# Patient Record
Sex: MALE | Race: BLACK OR AFRICAN AMERICAN | NOT HISPANIC OR LATINO | Employment: OTHER | URBAN - METROPOLITAN AREA
[De-identification: names, ages, dates, MRNs, and addresses within clinical notes are randomized per-mention and may not be internally consistent; named-entity substitution may affect disease eponyms.]

---

## 2023-02-27 PROBLEM — N40.1 BPH WITH OBSTRUCTION/LOWER URINARY TRACT SYMPTOMS: Status: ACTIVE | Noted: 2023-02-27

## 2023-02-27 PROBLEM — M10.9 GOUT: Status: ACTIVE | Noted: 2023-02-27

## 2023-02-27 PROBLEM — M72.2 PLANTAR FASCIITIS, RIGHT: Status: ACTIVE | Noted: 2023-02-27

## 2023-02-27 PROBLEM — M65.9 TENOSYNOVITIS, WRIST: Status: ACTIVE | Noted: 2023-02-27

## 2023-02-27 PROBLEM — N13.8 BPH WITH OBSTRUCTION/LOWER URINARY TRACT SYMPTOMS: Status: ACTIVE | Noted: 2023-02-27

## 2023-02-27 PROBLEM — K21.9 GERD (GASTROESOPHAGEAL REFLUX DISEASE): Status: ACTIVE | Noted: 2023-02-27

## 2023-02-27 PROBLEM — K63.5 BENIGN COLONIC POLYP: Status: ACTIVE | Noted: 2023-02-27

## 2023-02-27 PROBLEM — G47.30 SLEEP APNEA: Status: ACTIVE | Noted: 2023-02-27

## 2023-02-27 PROBLEM — M13.80 MIGRATORY POLYARTHRITIS: Status: ACTIVE | Noted: 2023-02-27

## 2023-02-27 PROBLEM — M89.8X1 SCAPULALGIA: Status: ACTIVE | Noted: 2023-02-27

## 2023-02-27 PROBLEM — M48.061 SPINAL STENOSIS OF LUMBAR REGION: Status: ACTIVE | Noted: 2023-02-27

## 2023-02-27 PROBLEM — G47.33 OSA (OBSTRUCTIVE SLEEP APNEA): Status: ACTIVE | Noted: 2023-02-27

## 2023-02-27 PROBLEM — E78.00 HYPERCHOLESTEREMIA: Status: ACTIVE | Noted: 2023-02-27

## 2023-02-27 PROBLEM — M25.532 LEFT WRIST PAIN: Status: ACTIVE | Noted: 2023-02-27

## 2023-02-27 PROBLEM — M54.50 LUMBAR BACK PAIN: Status: ACTIVE | Noted: 2023-02-27

## 2023-02-27 PROBLEM — M65.939 TENOSYNOVITIS, WRIST: Status: ACTIVE | Noted: 2023-02-27

## 2023-02-27 PROBLEM — I10 BENIGN ESSENTIAL HYPERTENSION: Status: ACTIVE | Noted: 2023-02-27

## 2023-02-27 PROBLEM — M19.90 INFLAMMATORY ARTHRITIS: Status: ACTIVE | Noted: 2023-02-27

## 2023-02-27 PROBLEM — E03.9 HYPOTHYROIDISM: Status: ACTIVE | Noted: 2023-02-27

## 2023-02-27 PROBLEM — M48.061 LUMBAR CANAL STENOSIS: Status: ACTIVE | Noted: 2023-02-27

## 2023-02-27 PROBLEM — L30.9 ECZEMA: Status: ACTIVE | Noted: 2023-02-27

## 2023-02-27 PROBLEM — M17.0 ARTHRITIS OF BOTH KNEES: Status: ACTIVE | Noted: 2023-02-27

## 2023-02-27 PROBLEM — R53.83 FATIGUE: Status: ACTIVE | Noted: 2023-02-27

## 2023-02-27 PROBLEM — R05.9 COUGH: Status: ACTIVE | Noted: 2023-02-27

## 2023-02-27 PROBLEM — R97.20 ELEVATED PSA: Status: ACTIVE | Noted: 2023-02-27

## 2023-02-27 PROBLEM — N18.31 STAGE 3A CHRONIC KIDNEY DISEASE (MULTI): Status: ACTIVE | Noted: 2023-02-27

## 2023-02-27 PROBLEM — D64.9 ANEMIA: Status: ACTIVE | Noted: 2023-02-27

## 2023-02-27 PROBLEM — M25.439 SWOLLEN WRIST: Status: ACTIVE | Noted: 2023-02-27

## 2023-02-27 RX ORDER — ATORVASTATIN CALCIUM 20 MG/1
1 TABLET, FILM COATED ORAL DAILY
COMMUNITY
Start: 2014-12-10 | End: 2023-08-28

## 2023-02-27 RX ORDER — ALLOPURINOL 300 MG/1
1 TABLET ORAL DAILY
COMMUNITY
Start: 2020-12-11 | End: 2023-04-04 | Stop reason: SDUPTHER

## 2023-02-27 RX ORDER — KETOCONAZOLE 20 MG/G
CREAM TOPICAL
COMMUNITY
Start: 2020-10-22 | End: 2024-02-08

## 2023-02-27 RX ORDER — VERAPAMIL HYDROCHLORIDE 180 MG/1
1 TABLET, FILM COATED, EXTENDED RELEASE ORAL DAILY
COMMUNITY
Start: 2022-01-31 | End: 2023-12-04 | Stop reason: ALTCHOICE

## 2023-02-27 RX ORDER — VERAPAMIL HYDROCHLORIDE 240 MG/1
1 CAPSULE, EXTENDED RELEASE ORAL DAILY
COMMUNITY
End: 2023-04-05 | Stop reason: SDUPTHER

## 2023-02-27 RX ORDER — TRIAMTERENE AND HYDROCHLOROTHIAZIDE 37.5; 25 MG/1; MG/1
1 CAPSULE ORAL DAILY
COMMUNITY
End: 2023-04-05 | Stop reason: SDUPTHER

## 2023-02-27 RX ORDER — TAMSULOSIN HYDROCHLORIDE 0.4 MG/1
1 CAPSULE ORAL DAILY
COMMUNITY
Start: 2015-10-27 | End: 2023-04-05 | Stop reason: SDUPTHER

## 2023-02-27 RX ORDER — LOSARTAN POTASSIUM 50 MG/1
1 TABLET ORAL DAILY
COMMUNITY
Start: 2019-06-25 | End: 2023-04-05 | Stop reason: SDUPTHER

## 2023-02-27 RX ORDER — LANSOPRAZOLE 30 MG/1
1 CAPSULE, DELAYED RELEASE ORAL DAILY
COMMUNITY
Start: 2015-10-27 | End: 2023-06-08

## 2023-02-27 RX ORDER — MULTIVIT-MIN/FERROUS FUMARATE 15 MG
TABLET ORAL
COMMUNITY
Start: 2020-12-11 | End: 2023-04-05 | Stop reason: SDUPTHER

## 2023-02-27 RX ORDER — TRIAMCINOLONE ACETONIDE 1 MG/G
OINTMENT TOPICAL
COMMUNITY
Start: 2019-12-12 | End: 2023-06-08

## 2023-04-04 ENCOUNTER — OFFICE VISIT (OUTPATIENT)
Dept: PRIMARY CARE | Facility: CLINIC | Age: 88
End: 2023-04-04
Payer: MEDICARE

## 2023-04-04 VITALS — WEIGHT: 247 LBS | BODY MASS INDEX: 34.45 KG/M2 | SYSTOLIC BLOOD PRESSURE: 108 MMHG | DIASTOLIC BLOOD PRESSURE: 52 MMHG

## 2023-04-04 DIAGNOSIS — R07.9 CHEST PAIN, UNSPECIFIED TYPE: ICD-10-CM

## 2023-04-04 DIAGNOSIS — E78.5 HYPERLIPIDEMIA, UNSPECIFIED HYPERLIPIDEMIA TYPE: ICD-10-CM

## 2023-04-04 DIAGNOSIS — R53.83 FATIGUE, UNSPECIFIED TYPE: Primary | ICD-10-CM

## 2023-04-04 DIAGNOSIS — I10 HYPERTENSION, UNSPECIFIED TYPE: ICD-10-CM

## 2023-04-04 DIAGNOSIS — D64.9 ANEMIA, UNSPECIFIED TYPE: ICD-10-CM

## 2023-04-04 PROCEDURE — 80053 COMPREHEN METABOLIC PANEL: CPT | Performed by: INTERNAL MEDICINE

## 2023-04-04 PROCEDURE — 80061 LIPID PANEL: CPT | Performed by: INTERNAL MEDICINE

## 2023-04-04 PROCEDURE — 3078F DIAST BP <80 MM HG: CPT | Performed by: INTERNAL MEDICINE

## 2023-04-04 PROCEDURE — 36415 COLL VENOUS BLD VENIPUNCTURE: CPT | Performed by: INTERNAL MEDICINE

## 2023-04-04 PROCEDURE — 84443 ASSAY THYROID STIM HORMONE: CPT | Performed by: INTERNAL MEDICINE

## 2023-04-04 PROCEDURE — 1170F FXNL STATUS ASSESSED: CPT | Performed by: INTERNAL MEDICINE

## 2023-04-04 PROCEDURE — 1036F TOBACCO NON-USER: CPT | Performed by: INTERNAL MEDICINE

## 2023-04-04 PROCEDURE — 99214 OFFICE O/P EST MOD 30 MIN: CPT | Performed by: INTERNAL MEDICINE

## 2023-04-04 PROCEDURE — 1159F MED LIST DOCD IN RCRD: CPT | Performed by: INTERNAL MEDICINE

## 2023-04-04 PROCEDURE — 3074F SYST BP LT 130 MM HG: CPT | Performed by: INTERNAL MEDICINE

## 2023-04-04 PROCEDURE — 1160F RVW MEDS BY RX/DR IN RCRD: CPT | Performed by: INTERNAL MEDICINE

## 2023-04-04 PROCEDURE — 85025 COMPLETE CBC W/AUTO DIFF WBC: CPT | Performed by: INTERNAL MEDICINE

## 2023-04-04 PROCEDURE — G0439 PPPS, SUBSEQ VISIT: HCPCS | Performed by: INTERNAL MEDICINE

## 2023-04-04 RX ORDER — TAMSULOSIN HYDROCHLORIDE 0.4 MG/1
0.4 CAPSULE ORAL
COMMUNITY
Start: 2022-04-05

## 2023-04-04 RX ORDER — ALLOPURINOL 300 MG/1
300 TABLET ORAL
COMMUNITY
Start: 2021-09-03

## 2023-04-04 RX ORDER — IBUPROFEN 800 MG/1
800 TABLET ORAL 3 TIMES DAILY PRN
COMMUNITY

## 2023-04-04 RX ORDER — LOSARTAN POTASSIUM 50 MG/1
50 TABLET ORAL
COMMUNITY
Start: 2021-08-05

## 2023-04-04 RX ORDER — TRIAMTERENE/HYDROCHLOROTHIAZID 37.5-25 MG
0.5 TABLET ORAL DAILY
COMMUNITY

## 2023-04-04 RX ORDER — VERAPAMIL HYDROCHLORIDE 240 MG/1
240 TABLET, FILM COATED, EXTENDED RELEASE ORAL
COMMUNITY
Start: 2021-10-16 | End: 2023-04-05 | Stop reason: SDUPTHER

## 2023-04-04 ASSESSMENT — ENCOUNTER SYMPTOMS
LOSS OF SENSATION IN FEET: 1
DEPRESSION: 0
OCCASIONAL FEELINGS OF UNSTEADINESS: 1

## 2023-04-04 ASSESSMENT — PATIENT HEALTH QUESTIONNAIRE - PHQ9
1. LITTLE INTEREST OR PLEASURE IN DOING THINGS: NOT AT ALL
SUM OF ALL RESPONSES TO PHQ9 QUESTIONS 1 AND 2: 0
2. FEELING DOWN, DEPRESSED OR HOPELESS: NOT AT ALL

## 2023-04-04 ASSESSMENT — ACTIVITIES OF DAILY LIVING (ADL)
BATHING: INDEPENDENT
DRESSING: INDEPENDENT

## 2023-04-04 NOTE — PROGRESS NOTES
Subjective   Reason for Visit: Norberto Bauman is an 87 y.o. male here for a Medicare Wellness visit.     Past Medical, Surgical, and Family History reviewed and updated in chart.    Reviewed all medications by prescribing practitioner or clinical pharmacist (such as prescriptions, OTCs, herbal therapies and supplements) and documented in the medical record.    HPI  87 years old male comes in to see me for a Medicare subsequent wellness exam and follow-up visit.  He feels well has no specific symptoms or complaints.  Feels tired upon exertion which he expected for his age and after he has reviewed it with his cardiologist.  No specific symptoms now.  Lives in [place] by himself is single.  Comes from a large family of 12 brothers and sisters.  7 still living.  He quit smoking 25 years ago after smoking 2 packs/day for 25 years.  No alcohol intake.  No surgery done on him in the past.  Retired teacher.  Mother  age 78 cancer of the colon.  Father  from heart attack and heart disease and is 84 years old,  Patient Care Team:  Bill Frye MD as PCP - General Anderson De La Torre DO as PCP - [employer] PCP     Review of Systems  12 system reviewed and negative for any new symptoms or complaint.  Ask if he likes to have his swelling in the elbows chronic tophi or chronic lipomas removed and he answered no.  Objective   Vitals:  /52 (BP Location: Left arm, Patient Position: Sitting)   Wt 112 kg (247 lb)   BMI 34.45 kg/m²       Physical Exam  Alert oriented pleasant cooperative in no distress.  Nonicteric sclera or jaundice.  Face symmetrical cranial nerves intact.  Lungs clear no rales wheezing or crackles.  Neck supple no masses lymph no thyromegaly or jugular venous distention.  Heart normal S1 and S2 regular rhythm.  Abdomen benign neurologically.  Assessment/Plan   Problem List Items Addressed This Visit          Hematologic    Anemia    Relevant Orders    CBC       Other    Fatigue -  Primary    Relevant Orders    Thyroid Stimulating Hormone     Other Visit Diagnoses       Hyperlipidemia, unspecified hyperlipidemia type        Relevant Orders    Lipid Panel    Hypertension, unspecified type        Relevant Orders    Comprehensive Metabolic Panel    Chest pain, unspecified type        Relevant Orders    ECG 12 lead (Ancillary Performed)

## 2023-04-05 ENCOUNTER — TELEPHONE (OUTPATIENT)
Dept: PRIMARY CARE | Facility: CLINIC | Age: 88
End: 2023-04-05
Payer: MEDICARE

## 2023-04-05 NOTE — TELEPHONE ENCOUNTER
I called the patient today and discussed his lab results with him.  Everything within normal limit.  He has been having a decline in his kidney function and he knows that for many years now.  He will be interested after we talk to him to start and try Farxiga to prevent any more further decline in renal function.  He will give it a shot not knowing if it is covered by his insurance but he will find out.

## 2023-06-03 DIAGNOSIS — L30.9 DERMATITIS, UNSPECIFIED: ICD-10-CM

## 2023-06-03 DIAGNOSIS — K21.9 GASTRO-ESOPHAGEAL REFLUX DISEASE WITHOUT ESOPHAGITIS: ICD-10-CM

## 2023-06-08 RX ORDER — TRIAMCINOLONE ACETONIDE 1 MG/G
OINTMENT TOPICAL
Qty: 454 G | Refills: 3 | Status: SHIPPED | OUTPATIENT
Start: 2023-06-08

## 2023-06-08 RX ORDER — LANSOPRAZOLE 30 MG/1
CAPSULE, DELAYED RELEASE ORAL
Qty: 90 CAPSULE | Refills: 1 | Status: SHIPPED | OUTPATIENT
Start: 2023-06-08 | End: 2023-12-04

## 2023-08-03 ENCOUNTER — OFFICE VISIT (OUTPATIENT)
Dept: PRIMARY CARE | Facility: CLINIC | Age: 88
End: 2023-08-03
Payer: MEDICARE

## 2023-08-03 VITALS
BODY MASS INDEX: 33.74 KG/M2 | TEMPERATURE: 96.8 F | SYSTOLIC BLOOD PRESSURE: 130 MMHG | WEIGHT: 241 LBS | HEIGHT: 71 IN | DIASTOLIC BLOOD PRESSURE: 70 MMHG

## 2023-08-03 DIAGNOSIS — R26.2 DIFFICULTY WALKING: ICD-10-CM

## 2023-08-03 DIAGNOSIS — I10 HYPERTENSION, UNSPECIFIED TYPE: Primary | ICD-10-CM

## 2023-08-03 DIAGNOSIS — M1A.9XX0 CHRONIC GOUT WITHOUT TOPHUS, UNSPECIFIED CAUSE, UNSPECIFIED SITE: ICD-10-CM

## 2023-08-03 DIAGNOSIS — E78.5 HYPERLIPIDEMIA, UNSPECIFIED HYPERLIPIDEMIA TYPE: ICD-10-CM

## 2023-08-03 DIAGNOSIS — K21.9 GASTROESOPHAGEAL REFLUX DISEASE WITHOUT ESOPHAGITIS: ICD-10-CM

## 2023-08-03 PROCEDURE — 1159F MED LIST DOCD IN RCRD: CPT | Performed by: INTERNAL MEDICINE

## 2023-08-03 PROCEDURE — 80053 COMPREHEN METABOLIC PANEL: CPT | Performed by: INTERNAL MEDICINE

## 2023-08-03 PROCEDURE — 1160F RVW MEDS BY RX/DR IN RCRD: CPT | Performed by: INTERNAL MEDICINE

## 2023-08-03 PROCEDURE — 80061 LIPID PANEL: CPT | Performed by: INTERNAL MEDICINE

## 2023-08-03 PROCEDURE — 99214 OFFICE O/P EST MOD 30 MIN: CPT | Performed by: INTERNAL MEDICINE

## 2023-08-03 PROCEDURE — 1126F AMNT PAIN NOTED NONE PRSNT: CPT | Performed by: INTERNAL MEDICINE

## 2023-08-03 PROCEDURE — 85025 COMPLETE CBC W/AUTO DIFF WBC: CPT | Performed by: INTERNAL MEDICINE

## 2023-08-03 PROCEDURE — 3078F DIAST BP <80 MM HG: CPT | Performed by: INTERNAL MEDICINE

## 2023-08-03 PROCEDURE — 3075F SYST BP GE 130 - 139MM HG: CPT | Performed by: INTERNAL MEDICINE

## 2023-08-03 PROCEDURE — 1036F TOBACCO NON-USER: CPT | Performed by: INTERNAL MEDICINE

## 2023-08-03 ASSESSMENT — PAIN SCALES - GENERAL: PAINLEVEL: 0-NO PAIN

## 2023-08-03 NOTE — PROGRESS NOTES
"Subjective   Patient ID: Norberto Bauman is a 88 y.o. male who presents for Follow-up (Follow up).    HPI   88 years old male comes in to see me today for a follow-up visit.  He feels well and has no specific complaint except he is feeling tired.  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  So he is not very active at all spend his time sitting and watching TV every day.  I encouraged him to have a plan and the program on a daily basis at least 5 days a week where he should go outside and walk or going in raining he should stay and is living room and walk around.  Dr. Martinez cardiologist stopped his verapamil because he was feeling very tired and he had swollen lower extremities.  He is feeling better now but still weak and tired.  His medications reviewed and reconciled.  Disability placard printed.  Review of Systems  12 system reviewed and 12 systems are negative.  He promised he would go on the plan and do it as we talk to him about and he felt very excited about that.  Objective   /70 (BP Location: Left arm, Patient Position: Sitting, BP Cuff Size: Adult)   Temp 36 °C (96.8 °F) (Temporal)   Ht 1.803 m (5' 11\")   Wt 109 kg (241 lb)   BMI 33.61 kg/m²     Physical Exam  Alert oriented in no acute distress.  Pleasant and cooperative.  Nonicteric sclera with no jaundice.  Face symmetrical cranial nerves intact.  Neck supple no masses no lymph node thyromegaly or jugular venous distention.  Lungs clear no rales no wheezing or crackles.  Heart normal S1 and S2 regular rhythm.  Abdomen benign neurologically intact.  He lost 6 pounds which is a good thing for him.  Lab was done and that will include CBC CMP and a lipid panel.  Assessment/Plan   Diagnoses and all orders for this visit:  Hypertension, unspecified type  -     Comprehensive Metabolic Panel  -     CBC  Hyperlipidemia, unspecified hyperlipidemia type  -     Lipid Panel  Difficulty walking  -     Disability Placard  Chronic gout without tophus, unspecified " cause, unspecified site  Gastroesophageal reflux disease without esophagitis

## 2023-08-10 ENCOUNTER — TELEPHONE (OUTPATIENT)
Dept: PRIMARY CARE | Facility: CLINIC | Age: 88
End: 2023-08-10
Payer: MEDICARE

## 2023-08-26 DIAGNOSIS — E78.00 PURE HYPERCHOLESTEROLEMIA, UNSPECIFIED: ICD-10-CM

## 2023-08-28 RX ORDER — ATORVASTATIN CALCIUM 20 MG/1
20 TABLET, FILM COATED ORAL DAILY
Qty: 90 TABLET | Refills: 3 | Status: SHIPPED | OUTPATIENT
Start: 2023-08-28

## 2023-12-02 DIAGNOSIS — K21.9 GASTRO-ESOPHAGEAL REFLUX DISEASE WITHOUT ESOPHAGITIS: ICD-10-CM

## 2023-12-04 ENCOUNTER — OFFICE VISIT (OUTPATIENT)
Dept: PRIMARY CARE | Facility: CLINIC | Age: 88
End: 2023-12-04
Payer: MEDICARE

## 2023-12-04 VITALS
BODY MASS INDEX: 34.38 KG/M2 | DIASTOLIC BLOOD PRESSURE: 70 MMHG | HEART RATE: 56 BPM | SYSTOLIC BLOOD PRESSURE: 116 MMHG | WEIGHT: 246.5 LBS | TEMPERATURE: 97.2 F | OXYGEN SATURATION: 95 %

## 2023-12-04 DIAGNOSIS — S20.411A: ICD-10-CM

## 2023-12-04 DIAGNOSIS — Z23 FLU VACCINE NEED: Primary | ICD-10-CM

## 2023-12-04 DIAGNOSIS — E78.5 HYPERLIPIDEMIA, UNSPECIFIED HYPERLIPIDEMIA TYPE: ICD-10-CM

## 2023-12-04 DIAGNOSIS — I10 HYPERTENSION, UNSPECIFIED TYPE: ICD-10-CM

## 2023-12-04 DIAGNOSIS — K21.9 GASTROESOPHAGEAL REFLUX DISEASE WITHOUT ESOPHAGITIS: ICD-10-CM

## 2023-12-04 PROCEDURE — G0008 ADMIN INFLUENZA VIRUS VAC: HCPCS | Performed by: INTERNAL MEDICINE

## 2023-12-04 PROCEDURE — 3078F DIAST BP <80 MM HG: CPT | Performed by: INTERNAL MEDICINE

## 2023-12-04 PROCEDURE — 1126F AMNT PAIN NOTED NONE PRSNT: CPT | Performed by: INTERNAL MEDICINE

## 2023-12-04 PROCEDURE — 1036F TOBACCO NON-USER: CPT | Performed by: INTERNAL MEDICINE

## 2023-12-04 PROCEDURE — 1159F MED LIST DOCD IN RCRD: CPT | Performed by: INTERNAL MEDICINE

## 2023-12-04 PROCEDURE — 90662 IIV NO PRSV INCREASED AG IM: CPT | Performed by: INTERNAL MEDICINE

## 2023-12-04 PROCEDURE — 99214 OFFICE O/P EST MOD 30 MIN: CPT | Performed by: INTERNAL MEDICINE

## 2023-12-04 PROCEDURE — 3074F SYST BP LT 130 MM HG: CPT | Performed by: INTERNAL MEDICINE

## 2023-12-04 PROCEDURE — 1160F RVW MEDS BY RX/DR IN RCRD: CPT | Performed by: INTERNAL MEDICINE

## 2023-12-04 RX ORDER — LANSOPRAZOLE 30 MG/1
CAPSULE, DELAYED RELEASE ORAL
Qty: 90 CAPSULE | Refills: 1 | Status: SHIPPED | OUTPATIENT
Start: 2023-12-04

## 2023-12-04 RX ORDER — PREDNISONE 10 MG/1
10 TABLET ORAL DAILY
Qty: 14 TABLET | Refills: 1 | Status: SHIPPED | OUTPATIENT
Start: 2023-12-04 | End: 2024-06-01

## 2023-12-04 ASSESSMENT — PATIENT HEALTH QUESTIONNAIRE - PHQ9
2. FEELING DOWN, DEPRESSED OR HOPELESS: NOT AT ALL
SUM OF ALL RESPONSES TO PHQ9 QUESTIONS 1 AND 2: 0
1. LITTLE INTEREST OR PLEASURE IN DOING THINGS: NOT AT ALL

## 2023-12-04 ASSESSMENT — ENCOUNTER SYMPTOMS
LOSS OF SENSATION IN FEET: 0
OCCASIONAL FEELINGS OF UNSTEADINESS: 1
DEPRESSION: 0

## 2023-12-04 NOTE — PROGRESS NOTES
Subjective   Patient ID: Norberto Bauman is a 88 y.o. male who presents for Follow-up (4 month fuv).    HPI   88 years old male comes in to see me today after he spent 1 day overnight at [facility] for right chest pain radiating to his right back exactly at the right scapula.  HE lift grocery stores heavy in general from the store to his apartment.  He has no fall and no injuries.  On examination I was able to make a diagnosis.  Review of Systems  12 system review 12 system negative.  Chest pain on the right side moving and radiating upward and to the back at the right scapula.  Chest x-ray done an EKG is done at the hospital or negative for  Objective   /70 (BP Location: Right arm, Patient Position: Sitting, BP Cuff Size: Large adult)   Pulse 56   Temp 36.2 °C (97.2 °F) (Temporal)   Wt 112 kg (246 lb 8 oz)   SpO2 95%   BMI 34.38 kg/m²     Physical Exam  .  He is alert oriented pleasant cooperative in no distress.  Nonicteric sclera no jaundice.  Face symmetrical cranial nerves intact.  Lungs clear no rales wheezing or crackles mostly on the right examined carefully and no abnormal wheezing or rales.  Heart normal S1 and S2 regular rhythm systolic ejection murmur present.  Abdomen benign neurologically intact.  Diagnosis of right scapulitis on the right side involving the right chest similar to pleurisy but very short-lived pain only lasting few seconds.  Not taking any medication for it.  Advised the patient to use a heating pad on his scapula half hour on every 2 hours while awake.  We gave him prednisone for 7 days 10 mg a day for the inflammation take care of that scapula.  Let me know course.  Assessment/Plan   Diagnoses and all orders for this visit:  Flu vaccine need  -     Flu vaccine, quadrivalent, high-dose, preservative free, age 65y+ (FLUZONE)  Abrasion of upper back excluding scapular region, right, initial encounter  -     predniSONE (Deltasone) 10 mg tablet; Take 1 tablet (10 mg)  by mouth once daily.  Hypertension, unspecified type  Hyperlipidemia, unspecified hyperlipidemia type  Gastroesophageal reflux disease without esophagitis

## 2023-12-04 NOTE — PROGRESS NOTES
Subjective   Patient ID: Norberto Bauman is a 88 y.o. male who presents for Follow-up (4 month fuv).    HPI     Review of Systems    Objective   /70 (BP Location: Right arm, Patient Position: Sitting, BP Cuff Size: Large adult)   Pulse 56   Temp 36.2 °C (97.2 °F) (Temporal)   Wt 112 kg (246 lb 8 oz)   SpO2 95%   BMI 34.38 kg/m²     Physical Exam    Assessment/Plan

## 2024-02-08 DIAGNOSIS — L30.9 DERMATITIS, UNSPECIFIED: ICD-10-CM

## 2024-02-08 RX ORDER — KETOCONAZOLE 20 MG/G
CREAM TOPICAL
Qty: 60 G | Refills: 3 | Status: SHIPPED | OUTPATIENT
Start: 2024-02-08 | End: 2024-02-09 | Stop reason: SDUPTHER

## 2024-02-09 ENCOUNTER — TELEPHONE (OUTPATIENT)
Dept: PRIMARY CARE | Facility: CLINIC | Age: 89
End: 2024-02-09
Payer: MEDICARE

## 2024-02-09 DIAGNOSIS — L30.9 DERMATITIS, UNSPECIFIED: ICD-10-CM

## 2024-02-09 RX ORDER — KETOCONAZOLE 20 MG/G
CREAM TOPICAL
Qty: 60 G | Refills: 3 | Status: SHIPPED | OUTPATIENT
Start: 2024-02-09 | End: 2024-02-13 | Stop reason: SDUPTHER

## 2024-02-13 ENCOUNTER — TELEPHONE (OUTPATIENT)
Dept: PRIMARY CARE | Facility: CLINIC | Age: 89
End: 2024-02-13
Payer: MEDICARE

## 2024-02-13 DIAGNOSIS — L30.9 DERMATITIS, UNSPECIFIED: ICD-10-CM

## 2024-02-13 RX ORDER — KETOCONAZOLE 20 MG/G
CREAM TOPICAL
Qty: 60 G | Refills: 3 | Status: SHIPPED | OUTPATIENT
Start: 2024-02-13

## 2024-02-29 ENCOUNTER — TELEMEDICINE (OUTPATIENT)
Dept: PRIMARY CARE | Facility: CLINIC | Age: 89
End: 2024-02-29
Payer: MEDICARE

## 2024-02-29 DIAGNOSIS — R19.7 DIARRHEA, UNSPECIFIED TYPE: Primary | ICD-10-CM

## 2024-02-29 PROCEDURE — 1126F AMNT PAIN NOTED NONE PRSNT: CPT | Performed by: INTERNAL MEDICINE

## 2024-02-29 PROCEDURE — 1036F TOBACCO NON-USER: CPT | Performed by: INTERNAL MEDICINE

## 2024-02-29 PROCEDURE — 99441 PR PHYS/QHP TELEPHONE EVALUATION 5-10 MIN: CPT | Performed by: INTERNAL MEDICINE

## 2024-03-01 NOTE — PROGRESS NOTES
Subjective   Patient ID: Norberto Bauman is a 88 y.o. male who presents for No chief complaint on file..    HPI   I had a telephone visit and video visit with Mr. Norberto Bauman.  He is complaining of intermittent diarrhea for the last 2 weeks.  Almost every other day.  He takes Pepto-Bismol alternating with Imodium once a day.  His bowels are soft as described by himself.  Our advice was to stop taking solid food for now at least for the coming 48 hours and to start on a clear liquid diet and that means water 7-Up or ginger ale with some Jell-O.  He should not be eating anything solid.  Stop taking Pepto-Bismol.  Change your Imodium to 3-4 times a day if needed after any diarrhea.  After 48 hours you may increase your diet to applesauce, toast, boiled eggs, and slowly increase your diet as tolerated.  Give yourself time to heal.  He understood the message.  Review of Systems  Diarrhea on and off every other day for the last 2 weeks.  Objective   There were no vitals taken for this visit.    Physical Exam  No physical exam was done.  Assessment/Plan   Diagnoses and all orders for this visit:  Diarrhea, unspecified type

## 2024-03-12 ENCOUNTER — OFFICE VISIT (OUTPATIENT)
Dept: PRIMARY CARE | Facility: CLINIC | Age: 89
End: 2024-03-12
Payer: MEDICARE

## 2024-03-12 ENCOUNTER — LAB REQUISITION (OUTPATIENT)
Dept: LAB | Facility: HOSPITAL | Age: 89
End: 2024-03-12
Payer: MEDICARE

## 2024-03-12 VITALS
TEMPERATURE: 97.2 F | OXYGEN SATURATION: 91 % | DIASTOLIC BLOOD PRESSURE: 67 MMHG | WEIGHT: 239 LBS | BODY MASS INDEX: 33.33 KG/M2 | HEART RATE: 62 BPM | SYSTOLIC BLOOD PRESSURE: 112 MMHG

## 2024-03-12 DIAGNOSIS — R19.7 DIARRHEA, UNSPECIFIED TYPE: Primary | ICD-10-CM

## 2024-03-12 DIAGNOSIS — I10 HYPERTENSION, UNSPECIFIED TYPE: ICD-10-CM

## 2024-03-12 DIAGNOSIS — E78.5 HYPERLIPIDEMIA, UNSPECIFIED HYPERLIPIDEMIA TYPE: ICD-10-CM

## 2024-03-12 DIAGNOSIS — R19.7 DIARRHEA, UNSPECIFIED: ICD-10-CM

## 2024-03-12 PROCEDURE — 99214 OFFICE O/P EST MOD 30 MIN: CPT | Performed by: INTERNAL MEDICINE

## 2024-03-12 PROCEDURE — 3078F DIAST BP <80 MM HG: CPT | Performed by: INTERNAL MEDICINE

## 2024-03-12 PROCEDURE — 87506 IADNA-DNA/RNA PROBE TQ 6-11: CPT

## 2024-03-12 PROCEDURE — 1126F AMNT PAIN NOTED NONE PRSNT: CPT | Performed by: INTERNAL MEDICINE

## 2024-03-12 PROCEDURE — 1036F TOBACCO NON-USER: CPT | Performed by: INTERNAL MEDICINE

## 2024-03-12 PROCEDURE — 1160F RVW MEDS BY RX/DR IN RCRD: CPT | Performed by: INTERNAL MEDICINE

## 2024-03-12 PROCEDURE — 1159F MED LIST DOCD IN RCRD: CPT | Performed by: INTERNAL MEDICINE

## 2024-03-12 PROCEDURE — 3074F SYST BP LT 130 MM HG: CPT | Performed by: INTERNAL MEDICINE

## 2024-03-12 ASSESSMENT — PATIENT HEALTH QUESTIONNAIRE - PHQ9
SUM OF ALL RESPONSES TO PHQ9 QUESTIONS 1 AND 2: 0
1. LITTLE INTEREST OR PLEASURE IN DOING THINGS: NOT AT ALL
2. FEELING DOWN, DEPRESSED OR HOPELESS: NOT AT ALL

## 2024-03-12 ASSESSMENT — ENCOUNTER SYMPTOMS
OCCASIONAL FEELINGS OF UNSTEADINESS: 0
LOSS OF SENSATION IN FEET: 0
DEPRESSION: 0

## 2024-03-12 NOTE — PROGRESS NOTES
Subjective   Patient ID: Norberto Bauman is a 88 y.o. male who presents for Diarrhea (Intermittently x 2-3 months).    HPI   88 years old male comes in to see me complaining of persistent diarrhea for the last 3 weeks.  We did talk to him about diarrhea 3 weeks ago on the phone and advised him to go on clear liquid diet for 2 to 3 days and can take Imodium as needed up to 3 or 4 times per day.  Following that to increase his diet slowly to morning a piece of fruit or a toast coffee yogurt, vitamin lunch and dinner and to increase the diet as tolerated.  He is eating all he can eat right now without any restriction.  He is complaining of soft stools not diarrhea per se as per definition.  He goes 3-4 times per day.  Denies any blood in the stools.  He lost 8 pounds.  Review of Systems  12 system review 12 system negative except for diarrhea and eating regular diet now  Objective   /67 (BP Location: Left arm, Patient Position: Sitting, BP Cuff Size: Adult)   Pulse 62   Temp 36.2 °C (97.2 °F) (Temporal)   Wt 108 kg (239 lb)   SpO2 91%   BMI 33.33 kg/m²     Physical Exam  Alert oriented in no distress.  Nonicteric sclera no jaundice.  He lost 8 pounds.  No jaundice.  Face symmetrical cranial nerves intact.  Neck supple no masses no lymph node thyromegaly or jugular venous distention.  Lungs clear no rales wheezing or crackles.  Heart normal S1 and S2 regular rhythm.  Abdomen benign neurologically intact abdomen nontender no masses no organomegaly.  No pain on palpations.  Not tympanic soft.  We agreed to obtain a stool specimen and send it for culture.  Agreed to start the diet again clear liquid diet for 5 days and to start use Lomotil as needed up to 3 or 4 times per day.  After that increase your diet slowly and gently.  Culture will be guiding us for a possibility of a treatment.  Other than that we will hear from the patient himself about his progression.  If diarrhea persist and cultures are negative  we will have to repeat a CBC and a CMP  Assessment/Plan   Diagnoses and all orders for this visit:  Diarrhea, unspecified type  -     Stool Pathogen Panel, PCR; Future

## 2024-03-13 LAB

## 2024-03-14 ENCOUNTER — TELEPHONE (OUTPATIENT)
Dept: PRIMARY CARE | Facility: CLINIC | Age: 89
End: 2024-03-14
Payer: MEDICARE

## 2024-03-14 NOTE — TELEPHONE ENCOUNTER
----- Message from Bill Frye MD sent at 3/14/2024 12:58 PM EDT -----  Regarding: r  Stool culture neg for all .  How is he ?

## 2024-03-21 ENCOUNTER — APPOINTMENT (OUTPATIENT)
Dept: LAB | Facility: LAB | Age: 89
End: 2024-03-21
Payer: MEDICARE

## 2024-03-21 ENCOUNTER — LAB (OUTPATIENT)
Dept: LAB | Facility: LAB | Age: 89
End: 2024-03-21
Payer: MEDICARE

## 2024-03-29 ENCOUNTER — TELEPHONE (OUTPATIENT)
Dept: PRIMARY CARE | Facility: CLINIC | Age: 89
End: 2024-03-29
Payer: MEDICARE

## 2024-03-29 NOTE — TELEPHONE ENCOUNTER
This MA called  patient, Patient is with diarrhea the stool pathology test done 03/12/24 which was negative .  Patient says he has been following Pcp direction but continue to have symptoms would like to be referred to GI specialty for the diarrhea.   Please place referral

## 2024-04-02 ENCOUNTER — OFFICE VISIT (OUTPATIENT)
Dept: PRIMARY CARE | Facility: CLINIC | Age: 89
End: 2024-04-02
Payer: MEDICARE

## 2024-04-02 VITALS
WEIGHT: 239 LBS | HEART RATE: 66 BPM | TEMPERATURE: 96.8 F | DIASTOLIC BLOOD PRESSURE: 68 MMHG | BODY MASS INDEX: 33.33 KG/M2 | OXYGEN SATURATION: 95 % | SYSTOLIC BLOOD PRESSURE: 111 MMHG

## 2024-04-02 DIAGNOSIS — A09 DIARRHEA, INFECTIOUS, ADULT: Primary | ICD-10-CM

## 2024-04-02 PROCEDURE — 1159F MED LIST DOCD IN RCRD: CPT | Performed by: INTERNAL MEDICINE

## 2024-04-02 PROCEDURE — 99213 OFFICE O/P EST LOW 20 MIN: CPT | Performed by: INTERNAL MEDICINE

## 2024-04-02 PROCEDURE — 1160F RVW MEDS BY RX/DR IN RCRD: CPT | Performed by: INTERNAL MEDICINE

## 2024-04-02 PROCEDURE — 3078F DIAST BP <80 MM HG: CPT | Performed by: INTERNAL MEDICINE

## 2024-04-02 PROCEDURE — 1126F AMNT PAIN NOTED NONE PRSNT: CPT | Performed by: INTERNAL MEDICINE

## 2024-04-02 PROCEDURE — 1036F TOBACCO NON-USER: CPT | Performed by: INTERNAL MEDICINE

## 2024-04-02 PROCEDURE — 3074F SYST BP LT 130 MM HG: CPT | Performed by: INTERNAL MEDICINE

## 2024-04-02 RX ORDER — METRONIDAZOLE 500 MG/1
500 TABLET ORAL 3 TIMES DAILY
Qty: 30 TABLET | Refills: 0 | Status: SHIPPED | OUTPATIENT
Start: 2024-04-02 | End: 2024-04-12

## 2024-04-02 ASSESSMENT — PAIN SCALES - GENERAL: PAINLEVEL: 0-NO PAIN

## 2024-04-02 ASSESSMENT — ENCOUNTER SYMPTOMS
OCCASIONAL FEELINGS OF UNSTEADINESS: 1
LOSS OF SENSATION IN FEET: 1
DEPRESSION: 0

## 2024-04-02 NOTE — PROGRESS NOTES
Subjective   Patient ID: Norberto Bauman is a 88 y.o. male who presents for Follow-up (Diarrhea every 2-3 days.).    HPI   88 years old male comes in to see me today complaining and concerned about persistent diarrhea.  Stops when he takes Lomotil and as soon as he stops it diarrhea recur.  He had stool cultures and they were all negative detected no infection.  Today he is complaining also of diarrhea again and he wants something done about it.  We agreed to give him Flagyl 3 times a day for 7 to 10 days and refer him to GI consultation for that reason for that purpose.  All of it will be done at [facility].  Review of Systems  12 system review 12 system are negative otherwise.    Objective   /68 (BP Location: Right arm, Patient Position: Sitting, BP Cuff Size: Large adult)   Pulse 66   Temp 36 °C (96.8 °F) (Temporal)   Wt 108 kg (239 lb)   SpO2 95%   BMI 33.33 kg/m²     Physical Exam  Alert oriented in no distress pleasant and cooperative.  Nonicteric sclera no jaundice.  Face symmetrical cranial nerves intact.  Neck supple no masses no lymph node thyromegaly or jugular venous distention.  Lungs clear no rales wheezing or crackles.  Heart normal S1 and S2 regular rhythm  Assessment/Plan   Diagnoses and all orders for this visit:  Diarrhea, infectious, adult  -     Stool Pathogen Panel, PCR  -     metroNIDAZOLE (Flagyl) 500 mg tablet; Take 1 tablet (500 mg) by mouth 3 times a day for 10 days.  -     Referral to Gastroenterology; Future

## 2024-06-19 ENCOUNTER — APPOINTMENT (OUTPATIENT)
Dept: GASTROENTEROLOGY | Facility: CLINIC | Age: 89
End: 2024-06-19
Payer: MEDICARE

## 2024-07-12 ENCOUNTER — APPOINTMENT (OUTPATIENT)
Dept: PRIMARY CARE | Facility: CLINIC | Age: 89
End: 2024-07-12
Payer: MEDICARE

## 2024-07-12 VITALS
SYSTOLIC BLOOD PRESSURE: 125 MMHG | HEART RATE: 52 BPM | WEIGHT: 236 LBS | BODY MASS INDEX: 32.92 KG/M2 | TEMPERATURE: 97.3 F | DIASTOLIC BLOOD PRESSURE: 77 MMHG | OXYGEN SATURATION: 96 %

## 2024-07-12 DIAGNOSIS — R41.3 MEMORY DEFICIT: ICD-10-CM

## 2024-07-12 DIAGNOSIS — R53.83 FATIGUE, UNSPECIFIED TYPE: Primary | ICD-10-CM

## 2024-07-12 DIAGNOSIS — E78.2 MIXED HYPERLIPIDEMIA: ICD-10-CM

## 2024-07-12 DIAGNOSIS — R26.2 DIFFICULTY IN WALKING: ICD-10-CM

## 2024-07-12 DIAGNOSIS — Q64.9 URINARY ANOMALY: ICD-10-CM

## 2024-07-12 PROCEDURE — 3078F DIAST BP <80 MM HG: CPT | Performed by: INTERNAL MEDICINE

## 2024-07-12 PROCEDURE — 3074F SYST BP LT 130 MM HG: CPT | Performed by: INTERNAL MEDICINE

## 2024-07-12 PROCEDURE — 1159F MED LIST DOCD IN RCRD: CPT | Performed by: INTERNAL MEDICINE

## 2024-07-12 PROCEDURE — 99214 OFFICE O/P EST MOD 30 MIN: CPT | Performed by: INTERNAL MEDICINE

## 2024-07-12 PROCEDURE — 1160F RVW MEDS BY RX/DR IN RCRD: CPT | Performed by: INTERNAL MEDICINE

## 2024-07-12 PROCEDURE — 1036F TOBACCO NON-USER: CPT | Performed by: INTERNAL MEDICINE

## 2024-07-12 ASSESSMENT — ENCOUNTER SYMPTOMS
OCCASIONAL FEELINGS OF UNSTEADINESS: 0
DEPRESSION: 0
LOSS OF SENSATION IN FEET: 0

## 2024-07-12 NOTE — PROGRESS NOTES
Subjective   Patient ID: Norberto Bauman is a 89 y.o. male who presents for Memory check (Patient want a handicap parking placard, alert button just in case he falls.).    HPI   89 years old man comes in to see me complaining of having trouble with his memory and remembering things.  He was having also severe diarrhea and was seen in the emergency room for that purpose but diarrhea subsided.  He is going to follow-up with the GI man he has an appointment.  Mini-Mental status examination was done on him today by myself and he scored 28.out of 30.  His medications reviewed and reconciled he takes allopurinol, atorvastatin, lansoprazole, losartan, tamsulosin and triamterene HCTZ.  He is on the same medication including tamsulosin.  Review of Systems  12 system review 12 system are negative.  Objective   /77 (BP Location: Right arm, Patient Position: Sitting, BP Cuff Size: Large adult)   Pulse 52   Temp 36.3 °C (97.3 °F) (Temporal)   Wt 107 kg (236 lb)   SpO2 96%   BMI 32.92 kg/m²     Physical Exam  Alert oriented in no distress pleasant cooperative.  Nonicteric sclera no jaundice.  Face symmetrical cranial nerves intact.  Neck supple no masses no lymph node no thyromegaly or jugular venous distention.  Lungs clear no rales wheezing or crackles.  Heart normal S1 and S2 regular rhythm systolic ejection murmur 2/6 present.  Abdomen benign neurologically intact.  Agreed to refer him to neurology for memory loss.  We also agreed to give him urology since his urologist left to [facility].  Disability placard provided.  Assessment/Plan   Diagnoses and all orders for this visit:  Fatigue, unspecified type  -     Thyroid Stimulating Hormone  Mixed hyperlipidemia  -     Lipid Panel  Difficulty in walking  -     Disability Placard  Memory deficit  -     Referral to Neurology; Future  Urinary anomaly  -     Referral to Urology; Future

## 2024-07-18 ENCOUNTER — TELEPHONE (OUTPATIENT)
Dept: PRIMARY CARE | Facility: CLINIC | Age: 89
End: 2024-07-18
Payer: MEDICARE

## 2024-07-18 NOTE — TELEPHONE ENCOUNTER
----- Message from Bill Frye sent at 7/17/2024 11:28 AM EDT -----  Regarding: r  Lab results from the last visit are within normal limit.  No change watch your diet exercise ambulate.

## 2024-08-19 DIAGNOSIS — R33.9 RETENTION OF URINE: Primary | ICD-10-CM

## 2024-08-29 ENCOUNTER — APPOINTMENT (OUTPATIENT)
Dept: UROLOGY | Facility: CLINIC | Age: 89
End: 2024-08-29
Payer: MEDICARE

## 2024-09-02 DIAGNOSIS — E78.00 PURE HYPERCHOLESTEROLEMIA, UNSPECIFIED: ICD-10-CM

## 2024-09-04 RX ORDER — ATORVASTATIN CALCIUM 20 MG/1
20 TABLET, FILM COATED ORAL DAILY
Qty: 90 TABLET | Refills: 3 | Status: SHIPPED | OUTPATIENT
Start: 2024-09-04

## 2024-10-02 ENCOUNTER — APPOINTMENT (OUTPATIENT)
Dept: GASTROENTEROLOGY | Facility: CLINIC | Age: 89
End: 2024-10-02
Payer: MEDICARE

## 2024-10-02 VITALS — OXYGEN SATURATION: 96 % | WEIGHT: 230 LBS | BODY MASS INDEX: 32.2 KG/M2 | HEART RATE: 61 BPM | HEIGHT: 71 IN

## 2024-10-02 DIAGNOSIS — R15.0 INCOMPLETE PASSAGE OF STOOL: Primary | ICD-10-CM

## 2024-10-02 DIAGNOSIS — K52.9 CHRONIC DIARRHEA: ICD-10-CM

## 2024-10-02 PROCEDURE — 1036F TOBACCO NON-USER: CPT | Performed by: INTERNAL MEDICINE

## 2024-10-02 PROCEDURE — 99203 OFFICE O/P NEW LOW 30 MIN: CPT | Performed by: INTERNAL MEDICINE

## 2024-10-02 PROCEDURE — 1159F MED LIST DOCD IN RCRD: CPT | Performed by: INTERNAL MEDICINE

## 2024-10-02 ASSESSMENT — ENCOUNTER SYMPTOMS
FEVER: 0
COUGH: 0
UNEXPECTED WEIGHT CHANGE: 0
CHILLS: 0
WEAKNESS: 0
SORE THROAT: 0
SHORTNESS OF BREATH: 0
CONFUSION: 0
DIZZINESS: 0
FREQUENCY: 1
MYALGIAS: 0
DYSURIA: 0
BRUISES/BLEEDS EASILY: 0
ARTHRALGIAS: 0
NERVOUS/ANXIOUS: 0
ADENOPATHY: 0
FATIGUE: 0
ROS GI COMMENTS: AS PER HPI

## 2024-10-02 NOTE — ASSESSMENT & PLAN NOTE
His diarrhea symptoms seem to have resolved.  He did have stool pathogen PCR testing earlier this year which was negative, and had unremarkable CT scan as well.  Last colonoscopy was in 2017, and repeat was not recommended due to age.  Given that the diarrhea has resolved, I do not think this needs further workup.

## 2024-10-02 NOTE — PROGRESS NOTES
Assessment/Plan    Norberto Bauman is an 89 y.o. male who presents to GI clinic for diarrhea and frequent stools.    Chronic diarrhea  His diarrhea symptoms seem to have resolved.  He did have stool pathogen PCR testing earlier this year which was negative, and had unremarkable CT scan as well.  Last colonoscopy was in 2017, and repeat was not recommended due to age.  Given that the diarrhea has resolved, I do not think this needs further workup.    Incomplete passage of stool  This is his main concern now.  He has 2-3 soft stools per day, but does not feel like he empties completely.  He is afraid to pass gas because he is unsure if stool will also come out.  We discussed a trial of daily fiber supplement - this should help bulk up stools and can also help facilitate more complete BMs.  He will let us know if this doesn't help.     He can follow up PRN.      Subjective     History of Present Illness   Norberto Bauman is an 89 y.o. male with PMHx of HTN, HLD, CKD, gout, obesity, ALENA, prostate cancer s/p brachytherapy 2017 who presents to GI clinic for further evaluation of diarrhea.  He no-showed to his appointment with me in June - states he got the time of the appointment wrong  Was having diarrhea, which has now stopped  Was taking Imodium back then, but not needing it now  Currently has 2-3 BMs per day, soft  Worried about passing gas since he is unsure if it will be stool or gas  Feels incomplete BMs  No blood in stools, no unexplained weight loss  Drinks plenty of water    Chart review:  Was in [place] ED in June for diarrhea - 6-8 watery stools per day for multiple months, with weight loss  CT in the ED showed only diverticulosis, and he could not provide stool sample  He had negative pathogen PCR in March  Colonoscopy 6/2017 by me showed fair prep, five small adenomas diverticulosis, internal hemorrhoids - no repeat recommended due to age    Past Medical History  As per HPI.     Social History  he   reports that he has quit smoking. His smoking use included cigarettes. He has been exposed to tobacco smoke. He has never used smokeless tobacco. He reports that he does not currently use alcohol after a past usage of about 1.0 standard drink of alcohol per week. He reports that he does not use drugs.     Family History  his family history includes Colon cancer in his brother; Pancreatic cancer in his mother.     Review of Systems  Review of Systems   Constitutional:  Negative for chills, fatigue, fever and unexpected weight change.   HENT:  Negative for sore throat.    Eyes:  Negative for visual disturbance.   Respiratory:  Negative for cough and shortness of breath.    Cardiovascular:  Negative for chest pain.   Gastrointestinal:         As per HPI   Genitourinary:  Positive for frequency. Negative for dysuria.   Musculoskeletal:  Positive for gait problem. Negative for arthralgias and myalgias.   Skin:  Negative for rash.   Neurological:  Negative for dizziness, syncope and weakness.   Hematological:  Negative for adenopathy. Does not bruise/bleed easily.   Psychiatric/Behavioral:  Negative for confusion. The patient is not nervous/anxious.        Allergies  Allergies   Allergen Reactions    Iodine Other       Medications  Current Outpatient Medications   Medication Instructions    atorvastatin (LIPITOR) 20 mg, oral, Daily    ibuprofen 800 mg, oral, 3 times daily PRN    ketoconazole (NIZOral) 2 % cream APPLY A THIN LAYER TO THE AFFECTED AREA(S) TWICE A DAY    lansoprazole (Prevacid) 30 mg DR capsule TAKE 1 CAPSULE BY MOUTH ONCE DAILY    losartan (COZAAR) 50 mg, oral, Daily RT    multivitamin with iron (TAB-A-EKATERINA/IRON ORAL) 1 tablet, oral, Daily    triamcinolone (Kenalog) 0.1 % ointment APPLY SPARINGLY AND RUB IN WELL TO AFFECTED AREA(S) TWICE DAILY. NOT FOR FACE, BREAST OR GROIN.    triamterene-hydrochlorothiazid (Maxzide-25) 37.5-25 mg tablet 0.5 tablets, oral, Daily        Objective     Visit Vitals  Pulse 61  "  Ht 1.803 m (5' 11\")   Wt 104 kg (230 lb)   SpO2 96%   BMI 32.08 kg/m²   Smoking Status Former   BSA 2.28 m²       Physical Exam  Constitutional:       Appearance: Normal appearance.   HENT:      Mouth/Throat:      Mouth: Mucous membranes are moist.   Eyes:      Extraocular Movements: Extraocular movements intact.   Cardiovascular:      Rate and Rhythm: Normal rate and regular rhythm.   Pulmonary:      Breath sounds: Normal breath sounds.   Abdominal:      General: Bowel sounds are normal. There is no distension.      Palpations: Abdomen is soft.      Tenderness: There is no abdominal tenderness. There is no guarding or rebound.   Musculoskeletal:         General: Swelling present.   Skin:     General: Skin is warm and dry.   Neurological:      General: No focal deficit present.      Mental Status: He is alert.   Psychiatric:         Mood and Affect: Mood normal.         Behavior: Behavior normal.           No results found for: \"WBC\", \"HGB\", \"HCT\", \"MCV\", \"PLT\"  Lab Results   Component Value Date     12/28/2022     06/17/2022    K 4.0 12/28/2022    K 4.4 06/17/2022     (H) 12/28/2022     (H) 06/17/2022    CO2 23 12/28/2022    CO2 22 06/17/2022    BUN 23 12/28/2022    BUN 42 (H) 06/17/2022    CREATININE 1.15 12/28/2022    CREATININE 1.61 (H) 06/17/2022    CALCIUM 10.0 12/28/2022    CALCIUM 10.3 06/17/2022    PROT 6.7 06/17/2022    BILITOT 0.5 06/17/2022    ALKPHOS 115 06/17/2022    ALT 38 06/17/2022    AST 42 (H) 06/17/2022    GLUCOSE 81 12/28/2022    GLUCOSE 85 06/17/2022       Recent Imaging    CT ABDOMEN AND PELVIS WITH IV CONTRAST     CLINICAL HISTORY: diarrhea 3 months, weight loss     TECHNIQUE: CT of the abdomen and pelvis was performed using standard   technique, scanning from just above the dome of the diaphragm to the   symphysis pubis.   MQ:  CTAP_3     Contrast:   IV: 100 ml of Omnipaque 350     CT Radiation dose: Integrated Dose-length product (DLP) for this visit =    892 " mGy*cm.   CT Dose Reduction Employed: Automated exposure control(AEC) and iterative   recon     COMPARISON: CT abdomen/pelvis 11/15/2023 and 8/31/2017       RESULT:     Liver: Redemonstration of right hepatic cysts.  There are a couple   additional subcentimeter low-attenuation lesions are too small to   characterize but likely benign.     Biliary: No bile duct dilation.  Gallbladder is unremarkable.     Spleen: No mass. No splenomegaly.     Pancreas: No mass or duct dilation.     Adrenals: No mass.     Kidneys: Bilateral cysts.  There are additional subcentimeter   low-attenuation lesions are too small to characterize but likely benign.    There is also a couple stable above water attenuation subcentimeter   lesions which are also too small to characterize but likely benign.  No   calculus or hydronephrosis.     GI tract: Small hiatal hernia.  No dilation or wall thickening. Normal   appendix.  Colonic diverticulosis without evidence of diverticulitis.     Lymph nodes: No abdominal or pelvic lymphadenopathy.     Mesentery/Peritoneum: No ascites or mass.     Retroperitoneum: No mass.     Vasculature:    - Abdominal aorta and iliac arteries: Atherosclerotic calcifications   without aneurysm.    - Celiac and SMA: Patent without stenosis.    - Portal venous system (SMV, splenic vein, portal vein and branches):   Patent.    - Hepatic veins: Patent.     Pelvis: No ascites or fluid collection. Small fat-containing right   inguinal hernia. Prostate brachytherapy seeds.     Bones/Soft Tissues: Degenerative changes.  Grade 1 anterolisthesis of L4   on L5.  Bilateral symmetric gynecomastia.     Lower thorax: Limited evaluation due to motion artifact.  Bilateral   interstitial thickening, ill-defined groundglass opacities, mild   bronchiectasis and cystic changes in the lung bases, similar over   multiple prior studies and likely chronic fibrotic changes.     IMPRESSION:     No acute abnormality in the abdomen or pelvis.      Colonic diverticulosis without evidence of diverticulitis.       Erick Clancy MD

## 2024-10-02 NOTE — PATIENT INSTRUCTIONS
I would recommend taking a daily fiber supplement such as Benefiber or Metamucil to help bulk up and regulate your stools.  I would recommend between a teaspoon or tablespoon of Benefiber every day.  You can buy this over the counter.

## 2024-10-02 NOTE — ASSESSMENT & PLAN NOTE
This is his main concern now.  He has 2-3 soft stools per day, but does not feel like he empties completely.  He is afraid to pass gas because he is unsure if stool will also come out.  We discussed a trial of daily fiber supplement - this should help bulk up stools and can also help facilitate more complete BMs.  He will let us know if this doesn't help.

## 2024-10-02 NOTE — LETTER
October 2, 2024     Bill Frye MD  [address] [facility] [address]    Patient: Norberto Bauman   YOB: 1935   Date of Visit: 10/2/2024       Dear Dr. Bill Frye MD:    Thank you for referring Norberto Bauman to me for evaluation. Below are the relevant portions of my assessment and plan of care.    Assessment / Plan:    Chronic diarrhea  His diarrhea symptoms seem to have resolved.  He did have stool pathogen PCR testing earlier this year which was negative, and had unremarkable CT scan as well.  Last colonoscopy was in 2017, and repeat was not recommended due to age.  Given that the diarrhea has resolved, I do not think this needs further workup.    Incomplete passage of stool  This is his main concern now.  He has 2-3 soft stools per day, but does not feel like he empties completely.  He is afraid to pass gas because he is unsure if stool will also come out.  We discussed a trial of daily fiber supplement - this should help bulk up stools and can also help facilitate more complete BMs.  He will let us know if this doesn't help.    He can follow up with me PRN.       If you have questions, please do not hesitate to reach out to me.       Sincerely,        Erick Clancy MD

## 2024-10-29 ENCOUNTER — APPOINTMENT (OUTPATIENT)
Dept: NEUROLOGY | Facility: CLINIC | Age: 89
End: 2024-10-29
Payer: MEDICARE

## 2025-01-10 ENCOUNTER — APPOINTMENT (OUTPATIENT)
Dept: PRIMARY CARE | Facility: CLINIC | Age: 89
End: 2025-01-10
Payer: MEDICARE

## 2025-01-17 ENCOUNTER — APPOINTMENT (OUTPATIENT)
Dept: PRIMARY CARE | Facility: CLINIC | Age: 89
End: 2025-01-17
Payer: MEDICARE

## 2025-02-05 ENCOUNTER — TELEPHONE (OUTPATIENT)
Dept: PRIMARY CARE | Facility: CLINIC | Age: 89
End: 2025-02-05
Payer: MEDICARE

## 2025-02-05 DIAGNOSIS — E78.00 PURE HYPERCHOLESTEROLEMIA, UNSPECIFIED: ICD-10-CM

## 2025-02-05 DIAGNOSIS — L30.9 DERMATITIS, UNSPECIFIED: ICD-10-CM

## 2025-02-05 DIAGNOSIS — K21.9 GASTRO-ESOPHAGEAL REFLUX DISEASE WITHOUT ESOPHAGITIS: ICD-10-CM

## 2025-02-05 DIAGNOSIS — I10 HYPERTENSION, UNSPECIFIED TYPE: Primary | ICD-10-CM

## 2025-02-05 RX ORDER — LANSOPRAZOLE 30 MG/1
30 CAPSULE, DELAYED RELEASE ORAL DAILY
Qty: 90 CAPSULE | Refills: 1 | Status: SHIPPED | OUTPATIENT
Start: 2025-02-05

## 2025-02-05 RX ORDER — TRIAMTERENE/HYDROCHLOROTHIAZID 37.5-25 MG
0.5 TABLET ORAL DAILY
Qty: 45 TABLET | Refills: 3 | Status: SHIPPED | OUTPATIENT
Start: 2025-02-05

## 2025-02-05 RX ORDER — KETOCONAZOLE 20 MG/G
CREAM TOPICAL
Qty: 60 G | Refills: 3 | Status: SHIPPED | OUTPATIENT
Start: 2025-02-05

## 2025-02-05 RX ORDER — ATORVASTATIN CALCIUM 20 MG/1
20 TABLET, FILM COATED ORAL DAILY
Qty: 90 TABLET | Refills: 3 | Status: SHIPPED | OUTPATIENT
Start: 2025-02-05

## 2025-02-05 RX ORDER — TRIAMCINOLONE ACETONIDE 1 MG/G
OINTMENT TOPICAL 2 TIMES DAILY
Qty: 454 G | Refills: 3 | Status: SHIPPED | OUTPATIENT
Start: 2025-02-05

## 2025-02-05 RX ORDER — LOSARTAN POTASSIUM 50 MG/1
50 TABLET ORAL
Qty: 90 TABLET | Refills: 3 | Status: SHIPPED | OUTPATIENT
Start: 2025-02-05

## 2025-02-05 NOTE — TELEPHONE ENCOUNTER
Patient is requesting a refill for atorvastatin 20 mg, ketoconazole cream, lansoprazole, triamterene, triamcinolone 0.1 ointment , and losartan to be sent to the [facility] on file located at [address]

## 2025-02-07 ENCOUNTER — APPOINTMENT (OUTPATIENT)
Dept: PRIMARY CARE | Facility: CLINIC | Age: 89
End: 2025-02-07
Payer: MEDICARE

## 2025-02-20 ENCOUNTER — APPOINTMENT (OUTPATIENT)
Dept: PRIMARY CARE | Facility: CLINIC | Age: 89
End: 2025-02-20
Payer: MEDICARE

## 2025-07-08 DIAGNOSIS — K21.9 GASTRO-ESOPHAGEAL REFLUX DISEASE WITHOUT ESOPHAGITIS: ICD-10-CM

## 2025-07-08 DIAGNOSIS — E78.00 PURE HYPERCHOLESTEROLEMIA, UNSPECIFIED: ICD-10-CM

## 2025-07-08 DIAGNOSIS — L30.9 DERMATITIS, UNSPECIFIED: ICD-10-CM

## 2025-07-08 DIAGNOSIS — M19.90 OSTEOARTHRITIS, UNSPECIFIED OSTEOARTHRITIS TYPE, UNSPECIFIED SITE: Primary | ICD-10-CM

## 2025-07-08 DIAGNOSIS — I10 HYPERTENSION, UNSPECIFIED TYPE: ICD-10-CM

## 2025-07-10 RX ORDER — LOSARTAN POTASSIUM 50 MG/1
50 TABLET ORAL
Qty: 90 TABLET | Refills: 3 | Status: SHIPPED | OUTPATIENT
Start: 2025-07-10

## 2025-07-10 RX ORDER — KETOCONAZOLE 20 MG/G
CREAM TOPICAL
Qty: 60 G | Refills: 3 | Status: SHIPPED | OUTPATIENT
Start: 2025-07-10

## 2025-07-10 RX ORDER — ATORVASTATIN CALCIUM 20 MG/1
20 TABLET, FILM COATED ORAL DAILY
Qty: 90 TABLET | Refills: 3 | Status: SHIPPED | OUTPATIENT
Start: 2025-07-10

## 2025-07-10 RX ORDER — LANSOPRAZOLE 30 MG/1
30 CAPSULE, DELAYED RELEASE ORAL DAILY
Qty: 90 CAPSULE | Refills: 1 | Status: SHIPPED | OUTPATIENT
Start: 2025-07-10

## 2025-07-10 RX ORDER — TRIAMCINOLONE ACETONIDE 1 MG/G
OINTMENT TOPICAL 2 TIMES DAILY
Qty: 454 G | Refills: 3 | Status: SHIPPED | OUTPATIENT
Start: 2025-07-10

## 2025-07-10 RX ORDER — IBUPROFEN 800 MG/1
800 TABLET, FILM COATED ORAL 3 TIMES DAILY PRN
Qty: 90 TABLET | Refills: 1 | Status: SHIPPED | OUTPATIENT
Start: 2025-07-10

## 2025-07-10 RX ORDER — TRIAMTERENE AND HYDROCHLOROTHIAZIDE 37.5; 25 MG/1; MG/1
0.5 TABLET ORAL DAILY
Qty: 45 TABLET | Refills: 3 | Status: SHIPPED | OUTPATIENT
Start: 2025-07-10